# Patient Record
Sex: FEMALE | Race: WHITE | URBAN - METROPOLITAN AREA
[De-identification: names, ages, dates, MRNs, and addresses within clinical notes are randomized per-mention and may not be internally consistent; named-entity substitution may affect disease eponyms.]

---

## 2025-06-12 ENCOUNTER — ANCILLARY PROCEDURE (OUTPATIENT)
Dept: URGENT CARE | Age: 65
End: 2025-06-12
Payer: MEDICARE

## 2025-06-12 ENCOUNTER — OFFICE VISIT (OUTPATIENT)
Dept: URGENT CARE | Age: 65
End: 2025-06-12
Payer: MEDICARE

## 2025-06-12 VITALS
DIASTOLIC BLOOD PRESSURE: 73 MMHG | HEIGHT: 64 IN | TEMPERATURE: 98.5 F | WEIGHT: 135 LBS | RESPIRATION RATE: 16 BRPM | HEART RATE: 76 BPM | SYSTOLIC BLOOD PRESSURE: 116 MMHG | OXYGEN SATURATION: 97 % | BODY MASS INDEX: 23.05 KG/M2

## 2025-06-12 DIAGNOSIS — S20.212A RIB CONTUSION, LEFT, INITIAL ENCOUNTER: ICD-10-CM

## 2025-06-12 DIAGNOSIS — S22.32XA CLOSED FRACTURE OF ONE RIB OF LEFT SIDE, INITIAL ENCOUNTER: ICD-10-CM

## 2025-06-12 DIAGNOSIS — S20.212A RIB CONTUSION, LEFT, INITIAL ENCOUNTER: Primary | ICD-10-CM

## 2025-06-12 PROCEDURE — 71101 X-RAY EXAM UNILAT RIBS/CHEST: CPT | Mod: LEFT SIDE | Performed by: PHYSICIAN ASSISTANT

## 2025-06-12 RX ORDER — ATORVASTATIN CALCIUM 80 MG/1
80 TABLET, FILM COATED ORAL
COMMUNITY
Start: 2025-05-17 | End: 2026-05-12

## 2025-06-12 RX ORDER — CHOLECALCIFEROL (VITAMIN D3) 25 MCG
2500 TABLET,CHEWABLE ORAL
COMMUNITY
Start: 2023-09-27

## 2025-06-12 RX ORDER — VENLAFAXINE HYDROCHLORIDE 150 MG/1
150 CAPSULE, EXTENDED RELEASE ORAL DAILY
COMMUNITY

## 2025-06-12 RX ORDER — FAMOTIDINE 20 MG/1
1 TABLET, FILM COATED ORAL 2 TIMES DAILY PRN
COMMUNITY
Start: 2024-03-27

## 2025-06-12 RX ORDER — LEVOTHYROXINE SODIUM 25 UG/1
25 TABLET ORAL
COMMUNITY
Start: 2023-09-27

## 2025-06-12 RX ORDER — CHOLECALCIFEROL (VITAMIN D3) 50 MCG
1 TABLET ORAL DAILY
COMMUNITY
Start: 2019-04-11

## 2025-06-12 RX ORDER — LIDOCAINE 50 MG/G
1 PATCH TOPICAL DAILY
Qty: 30 PATCH | Refills: 0 | Status: SHIPPED | OUTPATIENT
Start: 2025-06-12 | End: 2025-06-22

## 2025-06-12 RX ORDER — TRAMADOL HYDROCHLORIDE 50 MG/1
50 TABLET, FILM COATED ORAL EVERY 8 HOURS PRN
Qty: 12 TABLET | Refills: 0 | Status: SHIPPED | OUTPATIENT
Start: 2025-06-12 | End: 2025-06-17

## 2025-06-12 RX ORDER — METOPROLOL SUCCINATE 25 MG/1
25 TABLET, EXTENDED RELEASE ORAL
COMMUNITY
Start: 2025-02-12

## 2025-06-12 ASSESSMENT — PAIN SCALES - GENERAL: PAINLEVEL_OUTOF10: 5

## 2025-06-12 NOTE — PROGRESS NOTES
"  Subjective   Patient ID: Abida Mosqueda is a 64 y.o. female. They present today with a chief complaint of Rib Injury.    History of Present Illness  Abida is a 64 year old female presents to  with L rib pain s/p mechanical fall into a metal table yesterday. Pain worse with certain movements, coughing and sneezing. Denies abdominal pain, N/V/D. No SOB or CP. Denies difficulty breathing.           Past Medical History  Allergies as of 06/12/2025 - Reviewed 06/12/2025   Allergen Reaction Noted    Promethazine Other 07/03/2017       Prescriptions Prior to Admission[1]     Medical History[2]    Surgical History[3]     reports that she has never smoked. She has never been exposed to tobacco smoke. She has never used smokeless tobacco.    Review of Systems  Review of Systems                               Objective    Vitals:    06/12/25 1601   BP: 116/73   BP Location: Right arm   Patient Position: Sitting   BP Cuff Size: Adult   Pulse: 76   Resp: 16   Temp: 36.9 °C (98.5 °F)   TempSrc: Oral   SpO2: 97%   Weight: 61.2 kg (135 lb)   Height: 1.626 m (5' 4\")     No LMP recorded.    Physical Exam  Constitutional:       Appearance: Normal appearance.   HENT:      Head: Normocephalic and atraumatic.   Cardiovascular:      Rate and Rhythm: Normal rate and regular rhythm.      Heart sounds: No murmur heard.  Pulmonary:      Effort: Pulmonary effort is normal.      Breath sounds: Normal breath sounds. No wheezing.   Chest:      Chest wall: Tenderness present.      Comments: Tenderness to L lateral distal rib cage. No flail chest. Small area of ecchymosis just distal to L ribs. No sternal pain.   Abdominal:      General: Bowel sounds are normal.      Palpations: Abdomen is soft.      Tenderness: There is no abdominal tenderness. There is no guarding.   Skin:     General: Skin is warm and dry.   Neurological:      Mental Status: She is alert and oriented to person, place, and time.   Psychiatric:         Mood and " Affect: Mood normal.         Behavior: Behavior normal.         Procedures    Point of Care Test & Imaging Results from this visit  No results found for this visit on 06/12/25.   Imaging  XR ribs 2 views left w chest pa or ap  Result Date: 6/12/2025  Acute mildly displaced left anterior 8th rib fracture. Query acute nondisplaced left anterior 10th rib fracture. No pneumothorax.   No radiographic evidence of acute cardiopulmonary abnormality.     MACRO: None   Signed by: Parminder Benedict 6/12/2025 4:54 PM Dictation workstation:   [id number]      Cardiology, Vascular, and Other Imaging  No other imaging results found for the past 2 days      Diagnostic study results (if any) were reviewed by Marcella Fonseca PA-C.    Assessment/Plan   Allergies, medications, history, and pertinent labs/EKGs/Imaging reviewed by Marcella Fonseca PA-C.     Medical Decision Making  Abida presents with L rib pain s/p mechanical fall yesterday. XR's obtained showing L 8th rib fracture and questionable 10th rib fracture. Recommend continued tylenol/motrin. RX for lidocaine and short course ultram sent for pain. Recommend close PCP follow up next week.   Plan of care discussed with patient and/or family who verbalized understanding. Recommend Follow up with PCP. Advised seeking immediate emergency medical attention if symptoms fail to improve, worsen or any concerning symptoms arise. Patient/Guardian voiced full understanding and agreement to plan.      Orders and Diagnoses  Diagnoses and all orders for this visit:  Rib contusion, left, initial encounter  -     XR ribs 2 views left w chest pa or ap; Future  Closed fracture of one rib of left side, initial encounter  -     lidocaine (Lidoderm) 5 % patch; Place 1 patch over 12 hours on the skin once daily for 10 days. Apply to painful area 12 hours per day, remove for 12 hours.  -     traMADol (Ultram) 50 mg tablet; Take 1 tablet (50 mg) by mouth every 8 hours if needed for severe pain (7 - 10)  for up to 5 days.      Medical Admin Record      Patient disposition: Home    Electronically signed by Marcella Fonseca PA-C  5:14 PM           [1] (Not in a hospital admission)   [2]   Past Medical History:  Diagnosis Date    Hemorrhage of anus and rectum 2014    Rectal bleeding    Perianal venous thrombosis 2014    Thrombosed hemorrhoids    Personal history of other diseases of the respiratory system 2013    History of acute sinusitis   [3]   Past Surgical History:  Procedure Laterality Date     SECTION, CLASSIC  2013     Section    OTHER SURGICAL HISTORY  2013    ECG 12-Lead With Interpretation And Report

## 2025-06-12 NOTE — PATIENT INSTRUCTIONS
Diagnosis: Rib Contusion (Bruised Rib)    What It Is:  A rib contusion is a bruise to the muscles and tissues around the ribs, typically caused by a direct blow or trauma. While painful, it does not involve a broken bone and usually heals on its own over time.    Home Care Instructions:  1. Pain Control:  Take acetaminophen (Tylenol) or ibuprofen (Advil, Motrin) as needed for pain relief.  Acetaminophen: 500-1000 mg every 6 hours as needed (Max: 3,000 mg/day)  Ibuprofen: 400-600 mg every 6-8 hours with food (Max: 2,400 mg/day)    2. Breathing and Coughing:  Take deep breaths every hour while awake to keep your lungs expanded and prevent pneumonia.    Cough regularly, even if it hurts. Use a pillow to press gently against your chest (splinting) to reduce discomfort while coughing.    Avoid shallow breathing, as it can increase the risk of lung complications.    3. Ice Therapy:  Apply an ice pack (wrapped in cloth) to the affected area for 15-20 minutes every 2-3 hours during the first 48 hours to reduce pain and swelling.    4. Activity:  Avoid heavy lifting, intense exercise, or contact sports until pain improves.  Light activity like walking is encouraged to maintain lung function and circulation.    5. Sleeping:  Sleep in a semi-upright position (propped up with pillows) if it helps with breathing and pain control.    Follow-Up:  Follow up with your primary care provider in 3-5 days, or sooner if symptoms worsen.

## 2025-06-13 ENCOUNTER — TELEPHONE (OUTPATIENT)
Dept: URGENT CARE | Age: 65
End: 2025-06-13